# Patient Record
Sex: FEMALE | Race: WHITE
[De-identification: names, ages, dates, MRNs, and addresses within clinical notes are randomized per-mention and may not be internally consistent; named-entity substitution may affect disease eponyms.]

---

## 2020-04-05 ENCOUNTER — HOSPITAL ENCOUNTER (EMERGENCY)
Dept: HOSPITAL 9 - MADERS | Age: 20
Discharge: ANOTHER HOSPITAL | End: 2020-04-05
Payer: MEDICAID

## 2020-04-05 ENCOUNTER — HOSPITAL ENCOUNTER (EMERGENCY)
Dept: HOSPITAL 92 - ERS | Age: 20
Discharge: HOME | End: 2020-04-05
Payer: COMMERCIAL

## 2020-04-05 DIAGNOSIS — K80.20: Primary | ICD-10-CM

## 2020-04-05 LAB
ALBUMIN SERPL BCG-MCNC: 4.1 G/DL (ref 3.5–5)
ALP SERPL-CCNC: 83 U/L (ref 40–100)
ALT SERPL W P-5'-P-CCNC: 10 U/L (ref 8–55)
AMYLASE SERPL-CCNC: 47 U/L (ref 25–125)
ANION GAP SERPL CALC-SCNC: 16 MMOL/L (ref 10–20)
AST SERPL-CCNC: 10 U/L (ref 5–34)
BILIRUB SERPL-MCNC: 0.2 MG/DL (ref 0.2–1.2)
BUN SERPL-MCNC: 7 MG/DL (ref 7–18.7)
CALCIUM SERPL-MCNC: 8.9 MG/DL (ref 7.8–10.44)
CHLORIDE SERPL-SCNC: 105 MMOL/L (ref 98–107)
CO2 SERPL-SCNC: 21 MMOL/L (ref 22–29)
CREAT CL PREDICTED SERPL C-G-VRATE: 0 ML/MIN (ref 70–130)
GLOBULIN SER CALC-MCNC: 3.7 G/DL (ref 2.4–3.5)
GLUCOSE SERPL-MCNC: 139 MG/DL (ref 70–105)
HGB BLD-MCNC: 11.8 G/DL (ref 12–16)
LIPASE SERPL-CCNC: 20 U/L (ref 8–78)
MCH RBC QN AUTO: 25.4 PG (ref 25–35)
MCV RBC AUTO: 82.3 FL (ref 78–98)
MDIFF COMPLETE?: YES
PLATELET # BLD AUTO: 329 THOU/UL (ref 130–400)
POTASSIUM SERPL-SCNC: 3.4 MMOL/L (ref 3.5–5.1)
PREGS CONTROL BACKGROUND?: (no result)
PREGS CONTROL BAR APPEAR?: YES
RBC # BLD AUTO: 4.63 MILL/UL (ref 4–5.2)
SODIUM SERPL-SCNC: 139 MMOL/L (ref 136–145)
STOMATOCYTES BLD QL SMEAR: (no result) (100X)
WBC # BLD AUTO: 10.7 THOU/UL (ref 4.8–10.8)

## 2020-04-05 PROCEDURE — 83690 ASSAY OF LIPASE: CPT

## 2020-04-05 PROCEDURE — 96374 THER/PROPH/DIAG INJ IV PUSH: CPT

## 2020-04-05 PROCEDURE — 74177 CT ABD & PELVIS W/CONTRAST: CPT

## 2020-04-05 PROCEDURE — 82150 ASSAY OF AMYLASE: CPT

## 2020-04-05 PROCEDURE — 84703 CHORIONIC GONADOTROPIN ASSAY: CPT

## 2020-04-05 PROCEDURE — 96376 TX/PRO/DX INJ SAME DRUG ADON: CPT

## 2020-04-05 PROCEDURE — 85027 COMPLETE CBC AUTOMATED: CPT

## 2020-04-05 PROCEDURE — 76705 ECHO EXAM OF ABDOMEN: CPT

## 2020-04-05 PROCEDURE — 85007 BL SMEAR W/DIFF WBC COUNT: CPT

## 2020-04-05 PROCEDURE — 96375 TX/PRO/DX INJ NEW DRUG ADDON: CPT

## 2020-04-05 PROCEDURE — 80053 COMPREHEN METABOLIC PANEL: CPT

## 2020-04-05 PROCEDURE — 86140 C-REACTIVE PROTEIN: CPT

## 2020-04-05 PROCEDURE — 96361 HYDRATE IV INFUSION ADD-ON: CPT

## 2020-04-05 NOTE — CT
CT ABDOMEN AND PELVIS WITH IV CONTRAST:

 

DATE: 4/5/2020.

 

PROVIDED CLINICAL HISTORY: 

Epigastric pain.

 

FINDINGS: 

The visualized lung bases are free of significant opacity.

 

The liver, spleen, pancreas, kidneys, and adrenal glands demonstrate an unremarkable CT appearance.

 

There is heterogeneous material within the gallbladder lumen compatible with gallstones.  There is mo
derate gallbladder distention.  No overt pericholecystic inflammatory change is evident.  

 

There is no bowel dilatation, inflammatory fat stranding, free fluid, or free air apparent.  The appe
ndix appears normal.  A 4.9 cm simple-appearing left adnexal cystic structure, presumably physiologic
.

 

The osseous structures demonstrate no concerning lytic or blastic lesions.

 

IMPRESSION: 

Cholelithiasis with moderate gallbladder distention.  Consider right upper quadrant ultrasound.

 

POS: DAMIAN

## 2020-04-05 NOTE — ULT
Sonogram right upper quadrant



HISTORY: Right upper quadrant pain.



COMPARISON: CT abdomen 4/5/2020.



FINDINGS: Multiple shadowing echogenic stones are apparent within the gallbladder lumen. Gallbladder 
measures up to 9.3 cm length. Patient was reportedly not tender over the gallbladder fossa at the

time of the exam. No gallbladder wall thickening. Minimal pericholecystic fluid.



Common duct upper limits of normal at 0.7 cm.



Liver shows no focal abnormalities. No free fluid.



  



IMPRESSION :

Cholelithiasis. Gallbladder distention and common bile duct upper limits of normal in size are nonspe
cific and can be seen with central biliary obstruction. Patient was reportedly not tender over the

gallbladder fossa at the time of the exam.



Clinical correlation regarding other signs and symptoms of central biliary obstruction is required. R
adionucleotide hepatobiliary scan could be used to evaluate for biliary patency if needed.



Reported By: TANJA Deutsch 

Electronically Signed:  4/5/2020 11:28 AM

## 2020-10-22 ENCOUNTER — HOSPITAL ENCOUNTER (EMERGENCY)
Dept: HOSPITAL 9 - MADERS | Age: 20
Discharge: HOME | End: 2020-10-22
Payer: COMMERCIAL

## 2020-10-22 DIAGNOSIS — S42.301A: Primary | ICD-10-CM
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
## 2020-10-22 PROCEDURE — 29105 APPLICATION LONG ARM SPLINT: CPT

## 2020-10-22 NOTE — RAD
TWO VIEWS OF THE RIGHT HUMERUS: 

10/22/20

 

COMPARISON: 

None.

 

HISTORY: 

Injury with arm pain.

 

FINDINGS: 

Two views of the right humerus shows a moderately displaced fracture of the mid portion of the right 
humerus. Surrounding soft tissue swelling is seen. The visualized right thorax is unremarkable. 

 

IMPRESSION: 

Mid right humerus fracture. 

 

POS: EAA